# Patient Record
(demographics unavailable — no encounter records)

---

## 2024-10-21 NOTE — ASSESSMENT
[FreeTextEntry1] : 50 yo postmenopausal female has stage IA (cC2kM2p+M0) IDC, G2, ER/AL positive and Her-2 negative, s/p left breast lumpectomy and SLNB, and WBI. RS 10. s/p hysterectomy and BSO.  Assessment and Plan: -- Completed 5 years of endocrine therapy with Tamoxifen + Anastrazole on 6/2024. -- Breast exam today did not reveal palpable abnormality. B/l breast dx mammo on 4/30/2024. There was no suspicious finding. She is due for annual screening mammo on 5/1/2025, script given. -- Osteoporosis. Bone density in 4/30/2024 showed osteoporosis in AP spine with T score -2.9 and osteopenia in the femoral necka nd total hip with T score -1.2 and -1.1. She will continue on calcium and vitamin D supplement and regular exercise. We discussed to start bone directed agent such as Boniva or Prolia, she declined. -- Follow up with PCP, gyne and GI for health maintenance. -- RTO for f/u in 1 year. She will call if she has cocnerns.  Case was seen and discussed with Dr. Jernigan who agreed with assessment and plan.

## 2024-10-21 NOTE — HISTORY OF PRESENT ILLNESS
[de-identified] : 43 premenopausal female is referred by Dr. Hartmann for consultation of systemic adjuvant therapy for newly diagnosed left breast cancer.  Valerie had b/l screening tomosynthesis on 4/9/19 which showed heterogenously dense breasts, apparent mass, mid 1/3rd, upper outer quadrant, asymmetry in posterior 1.3rd superior L breast breast. There was no suspicious mass, calcifications, abnormalities in the right breast.\par  \par  4/16/19 -- L dx mammo and US \par  -persistent focal asymmetry in UOQ of L breast, measuring 1.1 cm \par  US \par  -@1N7, ill defined hypoechoic mass, measuring 1.2 x 1.2 x 1.2 cm, corresponds with mammo findings\par  -no suspicious left axillary lymphadenopathy\par  \par  4/17/19 -- L US CNBx @1N7\par  -IDC, well to moderately differentiated, focal micropapillary and lobular features \par  -ER 99%, MN 85%, Her 2 neg on FISH, Ki 67 5%.\par  \par  On 5/13/19, she underwent  left breast lumpectomy and SLN Bx. The pathology revealed  1.1 cm IDC, moderately differentiated, ER/MN +, Her 2 neg, DCIS intermediate nuclear grade, no LVI, margins negative and focal cLCIS. One of two SLNs had an isolated tumor cell cluster measuring 0.07 mm, -KARL.  AJCC 8th edition anatomic stage is IA ([id number]+M0).\par  \par  The surgical specimen was sent for Oncotype Dx analysis. Her RS is 10, in the low risk group.\par  \par  Valerie has a family h/o breast cancer in her maternal grand mother with BCA around 55. Her paternal grand father had PCA. Valerie had genetic testing prior to surgery. She is negative for BRCA mutations.\par  \par  She recovered well from surgery and has saw Dr. Negron for adjuvant breast radiotherapy. [de-identified] : 9/20/19: The patient is here for f/u visit. She completed adjuvant whole breast radiotherapy and has started on Tamoxifen daily. She had low abdominal tenderness and feeling gassy after she started Tamoxifen. But her symptoms have resolved now. She has regular period.  1/3/2020:  The patient is here for follow up. She is taking tamoxifen daily . She missed her periods for the last 2 months. She is scheduled for prophylactic hysterectomy with BSO on January 23rd. She had D&C for uterine polyps and worries that tamoxifen may increase the chance to develop additional polyps. Therefore, she decided to have prophylactic hysterectomy and BSO at the same time. She reported some discomfort and tingling at the lumpectomy site, but no palpable masses or nipple discharge.  5/4/2020: The patient is evaluated via telehealth. She has stage IA ([id number]+M0) IDC, G2, ER/IN positive and Her-2 negative, s/p left breast lumpectomy and SLNB on 5/13/19, RS 10, s/p WBI. She was initially on tamoxifen then switched to Anastrozole in 1/2020 after hysterectomy and BSO. She tolerated Anastrozole well. She does not have breast related symptoms. On 4/2/2020, she had leftt breast dx mammo and US. There was no suspicious finding. Bone density in 1/2020 showed mild osteopenia in AP spine with T score -1.5.  8/6/2020: The patient is evaluated vis telehealth with phone only. She has stage IA ([id number]+M0) IDC, G2, ER/IN positive and Her-2 negative, s/p left breast lumpectomy and SLNB on 5/13/19, RS 10, s/p WBI. She was initially on tamoxifen then switched to Anastrozole in 1/2020 after hysterectomy and BSO. She tolerated Anastrozole well. Due to COVID19 pandemic, she is not comfortable to come in. She report feeling well and does not have breast related symptoms. On 4/2/2020, she had left breast dx mammo and US. There was no suspicious finding. Bone density in 1/2020 showed mild osteopenia in AP spine with T score -1.5.  2/1/2021: The patient is evaluated vis virtual telehealth. She has stage IA (fV3tX7d+M0) IDC, G2, ER/IN positive and Her-2 negative, s/p left breast lumpectomy and SLNB on 5/13/19, RS 10, s/p WBI. She was initially on tamoxifen then switched to Anastrozole in 1/2020 after hysterectomy and BSO. She complains arthralgia and body aching. She does not have breast related symptoms. On 8/3/2020, she had left breast dx mammo and US. There was no suspicious finding. Bone density in 1/2020 showed mild osteopenia in AP spine with T score -1.5.  05/03/2021: MILAN is here for follow up visit for stage IA (pH4dM7i+M0) IDC, G2, ER/IN positive and Her-2 negative, s/p left breast lumpectomy and SLNB on 5/13/19, RS 10, s/p WBI. She was initially on tamoxifen then switched to Anastrozole in 1/2020 after hysterectomy and BSO. She continues on Anastrazole daily; tolerating well besides mild arthralgias. She denies any new breast symptoms at this time. Last bilateral diagnostic mammogram and US was completed on 3/25/2021 which showed no mammographic or sonographic evidence for malignancy. Bone density in 1/2020 showed mild osteopenia in AP spine with T score -1.5. She continues on calcium and vitamin d daily.   11/19/21: Milan is evaluated with telehealth by phone. She has stage IA (tO3xN3g+M0) IDC, G2, ER/IN positive and Her-2 negative, s/p left breast lumpectomy and SLNB on 5/13/19, RS 10, s/p WBI. She was initially on tamoxifen then switched to Anastrozole in 1/2020 after hysterectomy and BSO. She continues on Anastrazole daily; tolerating well besides mild arthralgias. She denies any new breast symptoms at this time. On 9/27/21, she had left breast dx mammo and there was no suspicious finding. Bone density in 1/2020 showed mild osteopenia in AP spine with T score -1.5. She continues on calcium and vitamin d daily.   04/06/2022: MILAN is here for follow up visit for stage IA ([id number]+M0) IDC, G2, ER/IN positive and Her-2 negative, s/p left breast lumpectomy and SLNB on 5/13/19, RS 10, s/p WBI. She was initially on tamoxifen then switched to Anastrozole in 1/2020 after hysterectomy and BSO. She continues on Anastrazole daily; tolerating well besides mild arthralgias. She denies any new breast symptoms at this time. 3/2022 bilateral mammogram showed benign left breast calcification  ; recommended unilateral diagnostic mammogram in 6 months. Bone density in 4/2022 showed osteoporosis  in AP spine with T score -2.8. She continues on calcium and vitamin d daily.   12/5/22: MILAN is here for follow up visit for stage IA (qT9bT0q+M0) IDC, G2, ER/IN positive and Her-2 negative, s/p left breast lumpectomy and SLNB on 5/13/19, RS 10, s/p WBI. She was initially on tamoxifen then switched to Anastrozole in 1/2020 after hysterectomy and BSO. She continues on Anastrazole daily. She complains hot flashes and some arthralgias and stiffness. In 9/2022, she had left breast dx mammo and US. There was no suspicious finding. Bone density in 4/2022 showed osteoporosis  in AP spine with T score -2.8. She continues on calcium and vitamin d daily. She is waiting for dental clearance.  1/4/24: MILAN is here for follow up visit for stage IA (aS2hH5g+M0) IDC, G2, ER/IN positive and Her-2 negative, s/p left breast lumpectomy and SLNB on 5/13/19, RS 10, s/p WBI. She was initially on Tamoxifen then switched to Anastrozole in 1/2020 after hysterectomy and BSO. She completed 5 years of endocrine therapy with Tamoxifen + Anastrazole on 6/2024. She had b/l breast dx mammo on 4/30/2024. There was no suspicious finding. Bone density in 4/30/2024 showed osteoporosis in AP spine with T score -2.9 and osteopenia in the femoral necka nd total hip with T score -1.2 and -1.1. She continues on calcium and vitamin d daily. She does not have new complains.  10/21/24: MILAN is here for follow up visit for stage IA ([id number]+M0) IDC, G2, ER/IN positive and Her-2 negative, s/p left breast lumpectomy and SLNB on 5/13/19, RS 10, s/p WBI. She was initially on Tamoxifen then switched to Anastrozole in 1/2020 after hysterectomy and BSO. She completed 5 years of endocrine therapy with Tamoxifen + Anastrazole on 6/2024. She had b/l breast dx mammo on 4/30/2024. There was no suspicious finding. Bone density in 4/30/2024 showed osteoporosis in AP spine with T score -2.9 and osteopenia in the femoral neck.

## 2024-10-21 NOTE — PHYSICAL EXAM
[Fully active, able to carry on all pre-disease performance without restriction] : Status 0 - Fully active, able to carry on all pre-disease performance without restriction [Normal] : affect appropriate [de-identified] : BReaste xam showed no palpable abnormality, no lymphadenopathy noted.

## 2024-10-21 NOTE — CONSULT LETTER
[Dear  ___] : Dear  [unfilled], [Courtesy Letter:] : I had the pleasure of seeing your patient, [unfilled], in my office today. [Please see my note below.] : Please see my note below. [Sincerely,] : Sincerely, [DrRavin  ___] : Dr. NATION [DrRavin ___] : Dr. NATION [FreeTextEntry3] : Michael Jernigan MD